# Patient Record
Sex: MALE | Race: WHITE | NOT HISPANIC OR LATINO | Employment: OTHER | URBAN - METROPOLITAN AREA
[De-identification: names, ages, dates, MRNs, and addresses within clinical notes are randomized per-mention and may not be internally consistent; named-entity substitution may affect disease eponyms.]

---

## 2023-01-01 ENCOUNTER — TELEPHONE (OUTPATIENT)
Dept: TRANSPLANT | Facility: HOSPITAL | Age: 58
End: 2023-01-01
Payer: MEDICARE

## 2023-01-01 ENCOUNTER — SOCIAL WORK (OUTPATIENT)
Dept: TRANSPLANT | Facility: HOSPITAL | Age: 58
End: 2023-01-01
Payer: MEDICARE

## 2023-01-01 ENCOUNTER — DOCUMENTATION (OUTPATIENT)
Dept: TRANSPLANT | Facility: HOSPITAL | Age: 58
End: 2023-01-01

## 2023-01-01 ENCOUNTER — OFFICE VISIT (OUTPATIENT)
Dept: TRANSPLANT | Facility: HOSPITAL | Age: 58
End: 2023-01-01
Payer: MEDICARE

## 2023-01-01 ENCOUNTER — EDUCATION (OUTPATIENT)
Dept: TRANSPLANT | Facility: HOSPITAL | Age: 58
End: 2023-01-01
Payer: MEDICARE

## 2023-01-01 ENCOUNTER — DOCUMENTATION (OUTPATIENT)
Dept: TRANSPLANT | Facility: HOSPITAL | Age: 58
End: 2023-01-01
Payer: MEDICARE

## 2023-01-01 ENCOUNTER — LAB REQUISITION (OUTPATIENT)
Dept: LAB | Facility: CLINIC | Age: 58
End: 2023-01-01
Payer: MEDICARE

## 2023-01-01 ENCOUNTER — LAB (OUTPATIENT)
Dept: LAB | Facility: LAB | Age: 58
End: 2023-01-01
Payer: MEDICARE

## 2023-01-01 ENCOUNTER — APPOINTMENT (OUTPATIENT)
Dept: TRANSPLANT | Facility: HOSPITAL | Age: 58
End: 2023-01-01
Payer: MEDICARE

## 2023-01-01 VITALS — HEIGHT: 70 IN | WEIGHT: 162 LBS | BODY MASS INDEX: 23.19 KG/M2

## 2023-01-01 VITALS
HEART RATE: 90 BPM | OXYGEN SATURATION: 95 % | SYSTOLIC BLOOD PRESSURE: 110 MMHG | WEIGHT: 162 LBS | BODY MASS INDEX: 23.99 KG/M2 | DIASTOLIC BLOOD PRESSURE: 62 MMHG | TEMPERATURE: 97 F | HEIGHT: 69 IN

## 2023-01-01 DIAGNOSIS — N18.6 END STAGE RENAL DISEASE (MULTI): ICD-10-CM

## 2023-01-01 DIAGNOSIS — R79.9 ABNORMAL FINDING OF BLOOD CHEMISTRY, UNSPECIFIED: ICD-10-CM

## 2023-01-01 DIAGNOSIS — Z51.81 ENCOUNTER FOR THERAPEUTIC DRUG LEVEL MONITORING: ICD-10-CM

## 2023-01-01 DIAGNOSIS — R94.5 ABNORMAL RESULTS OF LIVER FUNCTION STUDIES: ICD-10-CM

## 2023-01-01 DIAGNOSIS — Z01.818 PRE-TRANSPLANT EVALUATION FOR KIDNEY TRANSPLANT: ICD-10-CM

## 2023-01-01 DIAGNOSIS — Z12.5 ENCOUNTER FOR SCREENING FOR MALIGNANT NEOPLASM OF PROSTATE: ICD-10-CM

## 2023-01-01 DIAGNOSIS — N18.6 ESRD (END STAGE RENAL DISEASE) (MULTI): Primary | ICD-10-CM

## 2023-01-01 DIAGNOSIS — N18.6 ESRD ON DIALYSIS (MULTI): Primary | ICD-10-CM

## 2023-01-01 DIAGNOSIS — Z01.818 PRE-TRANSPLANT EVALUATION FOR KIDNEY TRANSPLANT: Primary | ICD-10-CM

## 2023-01-01 DIAGNOSIS — Z99.2 ESRD ON DIALYSIS (MULTI): Primary | ICD-10-CM

## 2023-01-01 LAB
ABO GROUP (TYPE) IN BLOOD: NORMAL
ALBUMIN SERPL BCP-MCNC: 3.4 G/DL (ref 3.4–5)
ALP SERPL-CCNC: 135 U/L (ref 33–136)
ALT SERPL W P-5'-P-CCNC: 19 U/L (ref 10–52)
AMPHETAMINES SERPL QL SCN: NEGATIVE NG/ML
AMYLASE SERPL-CCNC: 41 U/L (ref 29–103)
ANNOTATION COMMENT IMP: NORMAL
APPEARANCE UR: ABNORMAL
AST SERPL W P-5'-P-CCNC: 16 U/L (ref 9–39)
BACTERIA #/AREA URNS AUTO: ABNORMAL /HPF
BACTERIA UR CULT: ABNORMAL
BARBITURATES SERPL QL SCN: NEGATIVE NG/ML
BENZODIAZ SERPL QL SCN: NEGATIVE NG/ML
BILIRUB DIRECT SERPL-MCNC: 0.3 MG/DL (ref 0–0.3)
BILIRUB SERPL-MCNC: 0.7 MG/DL (ref 0–1.2)
BILIRUB UR STRIP.AUTO-MCNC: NEGATIVE MG/DL
BUN SERPL-MCNC: 85 MG/DL (ref 6–23)
BUPRENORPHINE SERPL-MCNC: NEGATIVE NG/ML
C PEPTIDE SERPL-MCNC: 6.1 NG/ML (ref 0.7–3.9)
CANNABINOIDS SERPL QL SCN: NEGATIVE NG/ML
CMV IGG AVIDITY SERPL IA-RTO: REACTIVE %
COCAINE SERPL QL SCN: NEGATIVE NG/ML
COLOR UR: YELLOW
COTININE SERPL-MCNC: <5 NG/ML
CREAT SERPL-MCNC: 8.4 MG/DL (ref 0.5–1.3)
EBV EA IGG SER QL: NEGATIVE
EBV NA AB SER QL: POSITIVE
EBV VCA IGG SER IA-ACNC: POSITIVE
EBV VCA IGM SER IA-ACNC: ABNORMAL
EBV VCA IGM SER-ACNC: >160 U/ML (ref 0–43.9)
ERYTHROCYTE [DISTWIDTH] IN BLOOD BY AUTOMATED COUNT: 18.6 % (ref 11.5–14.5)
EST. AVERAGE GLUCOSE BLD GHB EST-MCNC: 166 MG/DL
FLOW AUTOCROSSMATCH: NORMAL
FLOW AUTOCROSSMATCH: NORMAL
GFR SERPL CREATININE-BSD FRML MDRD: 7 ML/MIN/1.73M*2
GLUCOSE UR STRIP.AUTO-MCNC: ABNORMAL MG/DL
HBA1C MFR BLD: 7.4 %
HBV CORE AB SER QL: NONREACTIVE
HBV SURFACE AB SER-ACNC: 11.3 MIU/ML
HBV SURFACE AG SERPL QL IA: NONREACTIVE
HCT VFR BLD AUTO: 35.5 % (ref 41–52)
HCV AB SER QL: NONREACTIVE
HGB BLD-MCNC: 11.1 G/DL (ref 13.5–17.5)
HIV 1+2 AB+HIV1 P24 AG SERPL QL IA: NONREACTIVE
HLA CLASS I AB SCREEN,FC: NORMAL
HLA CLASS II AB SCREEN,FC: NORMAL
HLA CLS I TYP PNL BLD/T DONR HIGH RES: NORMAL
HLA CLS I TYP PNL BLD/T DONR HIGH RES: NORMAL
HLA RESULTS: NORMAL
HLA-A+B+C AB NFR SER: NORMAL %
HLA-DP+DQ+DR AB NFR SER: NORMAL %
HLA-DP2 QL: NORMAL
HLA-DP2 QL: NORMAL
HLA-DQB1 HIGH RES: NORMAL
HLA-DQB1 HIGH RES: NORMAL
HLA-DRB1 HIGH RES: NORMAL
HLA-DRB1 HIGH RES: NORMAL
HOLD SPECIMEN: NORMAL
INR PPP: 1.2 (ref 0.9–1.1)
KETONES UR STRIP.AUTO-MCNC: NEGATIVE MG/DL
LEUKOCYTE ESTERASE UR QL STRIP.AUTO: ABNORMAL
MCH RBC QN AUTO: 28.2 PG (ref 26–34)
MCHC RBC AUTO-ENTMCNC: 31.3 G/DL (ref 32–36)
MCV RBC AUTO: 90 FL (ref 80–100)
METHADONE SERPL QL SCN: NEGATIVE NG/ML
METHAMPHET SERPL QL: NEGATIVE NG/ML
NICOTINE SERPL-MCNC: <5 NG/ML
NIL(NEG) CONTROL SPOT COUNT: NORMAL
NITRITE UR QL STRIP.AUTO: NEGATIVE
NRBC BLD-RTO: 0 /100 WBCS (ref 0–0)
OPIATES SERPL QL SCN: NEGATIVE NG/ML
OXYCODONE SERPL QL: NEGATIVE NG/ML
PANEL A SPOT COUNT: 0
PANEL B SPOT COUNT: 0
PCP SERPL QL SCN: NEGATIVE NG/ML
PH UR STRIP.AUTO: 6 [PH]
PHOSPHATE SERPL-MCNC: 4.3 MG/DL (ref 2.5–4.9)
PLATELET # BLD AUTO: 139 X10*3/UL (ref 150–450)
POS CONTROL SPOT COUNT: NORMAL
PROT SERPL-MCNC: 6.9 G/DL (ref 6.4–8.2)
PROT UR STRIP.AUTO-MCNC: ABNORMAL MG/DL
PROTHROMBIN TIME: 13.8 SECONDS (ref 9.8–12.8)
PSA FREE MFR SERPL: 22 %
PSA FREE SERPL-MCNC: 1.3 NG/ML
PSA SERPL IA-MCNC: 5.8 NG/ML (ref 0–4)
RBC # BLD AUTO: 3.94 X10*6/UL (ref 4.5–5.9)
RBC # UR STRIP.AUTO: ABNORMAL /UL
RBC #/AREA URNS AUTO: >20 /HPF
RH FACTOR (ANTIGEN D): NORMAL
SP GR UR STRIP.AUTO: 1.01
T PALLIDUM AB SER QL: NONREACTIVE
T-SPOT. TB INTERPRETATION: NEGATIVE
UROBILINOGEN UR STRIP.AUTO-MCNC: <2 MG/DL
VZV QPCR,QUANT,PLASMA, VIRC: NOT DETECTED COPIES/ML
WBC # BLD AUTO: 8.6 X10*3/UL (ref 4.4–11.3)
WBC #/AREA URNS AUTO: >50 /HPF

## 2023-01-01 PROCEDURE — 86481 TB AG RESPONSE T-CELL SUSP: CPT

## 2023-01-01 PROCEDURE — 85027 COMPLETE CBC AUTOMATED: CPT

## 2023-01-01 PROCEDURE — 84100 ASSAY OF PHOSPHORUS: CPT

## 2023-01-01 PROCEDURE — 86901 BLOOD TYPING SEROLOGIC RH(D): CPT

## 2023-01-01 PROCEDURE — 82565 ASSAY OF CREATININE: CPT

## 2023-01-01 PROCEDURE — 80076 HEPATIC FUNCTION PANEL: CPT

## 2023-01-01 PROCEDURE — 87186 SC STD MICRODIL/AGAR DIL: CPT

## 2023-01-01 PROCEDURE — 99215 OFFICE O/P EST HI 40 MIN: CPT | Performed by: STUDENT IN AN ORGANIZED HEALTH CARE EDUCATION/TRAINING PROGRAM

## 2023-01-01 PROCEDURE — 86665 EPSTEIN-BARR CAPSID VCA: CPT

## 2023-01-01 PROCEDURE — 84154 ASSAY OF PSA FREE: CPT

## 2023-01-01 PROCEDURE — 85610 PROTHROMBIN TIME: CPT

## 2023-01-01 PROCEDURE — 87389 HIV-1 AG W/HIV-1&-2 AB AG IA: CPT

## 2023-01-01 PROCEDURE — 87799 DETECT AGENT NOS DNA QUANT: CPT

## 2023-01-01 PROCEDURE — 87340 HEPATITIS B SURFACE AG IA: CPT

## 2023-01-01 PROCEDURE — 80307 DRUG TEST PRSMV CHEM ANLYZR: CPT

## 2023-01-01 PROCEDURE — 86663 EPSTEIN-BARR ANTIBODY: CPT

## 2023-01-01 PROCEDURE — 86825 HLA X-MATH NON-CYTOTOXIC: CPT | Mod: OUT | Performed by: SURGERY

## 2023-01-01 PROCEDURE — 3008F BODY MASS INDEX DOCD: CPT | Performed by: HOSPITALIST

## 2023-01-01 PROCEDURE — 99205 OFFICE O/P NEW HI 60 MIN: CPT

## 2023-01-01 PROCEDURE — 99215 OFFICE O/P EST HI 40 MIN: CPT

## 2023-01-01 PROCEDURE — 82150 ASSAY OF AMYLASE: CPT

## 2023-01-01 PROCEDURE — 87086 URINE CULTURE/COLONY COUNT: CPT

## 2023-01-01 PROCEDURE — 86706 HEP B SURFACE ANTIBODY: CPT

## 2023-01-01 PROCEDURE — 80323 ALKALOIDS NOS: CPT

## 2023-01-01 PROCEDURE — 86900 BLOOD TYPING SEROLOGIC ABO: CPT

## 2023-01-01 PROCEDURE — 3008F BODY MASS INDEX DOCD: CPT | Performed by: STUDENT IN AN ORGANIZED HEALTH CARE EDUCATION/TRAINING PROGRAM

## 2023-01-01 PROCEDURE — 84520 ASSAY OF UREA NITROGEN: CPT

## 2023-01-01 PROCEDURE — 99417 PROLNG OP E/M EACH 15 MIN: CPT | Performed by: STUDENT IN AN ORGANIZED HEALTH CARE EDUCATION/TRAINING PROGRAM

## 2023-01-01 PROCEDURE — G2212 PROLONG OUTPT/OFFICE VIS: HCPCS | Performed by: STUDENT IN AN ORGANIZED HEALTH CARE EDUCATION/TRAINING PROGRAM

## 2023-01-01 PROCEDURE — 86664 EPSTEIN-BARR NUCLEAR ANTIGEN: CPT

## 2023-01-01 PROCEDURE — 81001 URINALYSIS AUTO W/SCOPE: CPT

## 2023-01-01 PROCEDURE — 86803 HEPATITIS C AB TEST: CPT

## 2023-01-01 PROCEDURE — 86780 TREPONEMA PALLIDUM: CPT

## 2023-01-01 PROCEDURE — G0103 PSA SCREENING: HCPCS

## 2023-01-01 PROCEDURE — 86644 CMV ANTIBODY: CPT

## 2023-01-01 PROCEDURE — 36415 COLL VENOUS BLD VENIPUNCTURE: CPT

## 2023-01-01 PROCEDURE — 81382 HLA II TYPING 1 LOC HR: CPT | Mod: 59,OUT | Performed by: SURGERY

## 2023-01-01 PROCEDURE — 86704 HEP B CORE ANTIBODY TOTAL: CPT

## 2023-01-01 PROCEDURE — 84681 ASSAY OF C-PEPTIDE: CPT

## 2023-01-01 PROCEDURE — 86832 HLA CLASS I HIGH DEFIN QUAL: CPT | Mod: OUT | Performed by: SURGERY

## 2023-01-01 PROCEDURE — 83036 HEMOGLOBIN GLYCOSYLATED A1C: CPT

## 2023-01-01 RX ORDER — SEVELAMER CARBONATE 800 MG/1
800 TABLET, FILM COATED ORAL
COMMUNITY
Start: 2023-01-01

## 2023-01-01 RX ORDER — INSULIN ASPART 100 [IU]/ML
INJECTION, SOLUTION INTRAVENOUS; SUBCUTANEOUS
COMMUNITY
Start: 2017-06-02

## 2023-01-01 RX ORDER — OMEPRAZOLE 40 MG/1
40 CAPSULE, DELAYED RELEASE ORAL
COMMUNITY
Start: 2023-01-01

## 2023-01-01 RX ORDER — INSULIN DETEMIR 100 [IU]/ML
20 INJECTION, SOLUTION SUBCUTANEOUS NIGHTLY
COMMUNITY
Start: 2018-07-12

## 2023-01-01 RX ORDER — ASPIRIN 81 MG/1
81 TABLET ORAL DAILY
COMMUNITY
Start: 2017-09-12

## 2023-01-01 RX ORDER — PREDNISONE 5 MG/1
5 TABLET ORAL DAILY
COMMUNITY
Start: 2023-01-01

## 2023-01-01 RX ORDER — FERROUS SULFATE 325(65) MG
325 TABLET ORAL
COMMUNITY

## 2023-01-01 RX ORDER — TACROLIMUS 1 MG/1
1 CAPSULE ORAL 2 TIMES DAILY
COMMUNITY
Start: 2021-10-30

## 2023-01-01 RX ORDER — AMIODARONE HYDROCHLORIDE 200 MG/1
200 TABLET ORAL DAILY
COMMUNITY
Start: 2023-01-01 | End: 2024-07-26

## 2023-01-01 RX ORDER — ACETAMINOPHEN 500 MG
2000 TABLET ORAL DAILY
COMMUNITY

## 2023-01-01 RX ORDER — BRIMONIDINE TARTRATE 1 MG/ML
1 SOLUTION/ DROPS OPHTHALMIC 2 TIMES DAILY
COMMUNITY

## 2023-01-01 RX ORDER — ATORVASTATIN CALCIUM 40 MG/1
40 TABLET, FILM COATED ORAL DAILY
COMMUNITY

## 2023-01-01 RX ORDER — MULTIVITAMIN
1 TABLET ORAL DAILY
COMMUNITY

## 2023-01-01 ASSESSMENT — ANXIETY QUESTIONNAIRES
7. FEELING AFRAID AS IF SOMETHING AWFUL MIGHT HAPPEN: NOT AT ALL
GAD7 TOTAL SCORE: 0
IF YOU CHECKED OFF ANY PROBLEMS ON THIS QUESTIONNAIRE, HOW DIFFICULT HAVE THESE PROBLEMS MADE IT FOR YOU TO DO YOUR WORK, TAKE CARE OF THINGS AT HOME, OR GET ALONG WITH OTHER PEOPLE: NOT DIFFICULT AT ALL
1. FEELING NERVOUS, ANXIOUS, OR ON EDGE: NOT AT ALL
5. BEING SO RESTLESS THAT IT IS HARD TO SIT STILL: NOT AT ALL
3. WORRYING TOO MUCH ABOUT DIFFERENT THINGS: NOT AT ALL
4. TROUBLE RELAXING: NOT AT ALL
6. BECOMING EASILY ANNOYED OR IRRITABLE: NOT AT ALL
2. NOT BEING ABLE TO STOP OR CONTROL WORRYING: NOT AT ALL

## 2023-01-01 ASSESSMENT — PATIENT HEALTH QUESTIONNAIRE - PHQ9
1. LITTLE INTEREST OR PLEASURE IN DOING THINGS: NOT AT ALL
10. IF YOU CHECKED OFF ANY PROBLEMS, HOW DIFFICULT HAVE THESE PROBLEMS MADE IT FOR YOU TO DO YOUR WORK, TAKE CARE OF THINGS AT HOME, OR GET ALONG WITH OTHER PEOPLE: NOT DIFFICULT AT ALL
2. FEELING DOWN, DEPRESSED OR HOPELESS: NOT AT ALL
6. FEELING BAD ABOUT YOURSELF - OR THAT YOU ARE A FAILURE OR HAVE LET YOURSELF OR YOUR FAMILY DOWN: NOT AT ALL
SUM OF ALL RESPONSES TO PHQ QUESTIONS 1-9: 1
SUM OF ALL RESPONSES TO PHQ9 QUESTIONS 1 & 2: 0
3. TROUBLE FALLING OR STAYING ASLEEP OR SLEEPING TOO MUCH: NOT AT ALL
8. MOVING OR SPEAKING SO SLOWLY THAT OTHER PEOPLE COULD HAVE NOTICED. OR THE OPPOSITE, BEING SO FIGETY OR RESTLESS THAT YOU HAVE BEEN MOVING AROUND A LOT MORE THAN USUAL: NOT AT ALL
5. POOR APPETITE OR OVEREATING: NOT AT ALL
7. TROUBLE CONCENTRATING ON THINGS, SUCH AS READING THE NEWSPAPER OR WATCHING TELEVISION: NOT AT ALL
9. THOUGHTS THAT YOU WOULD BE BETTER OFF DEAD, OR OF HURTING YOURSELF: NOT AT ALL
4. FEELING TIRED OR HAVING LITTLE ENERGY: SEVERAL DAYS

## 2023-01-01 ASSESSMENT — PAIN SCALES - GENERAL: PAINLEVEL: 0-NO PAIN

## 2023-12-05 NOTE — PROGRESS NOTES
"ENCOUNTER    Visit Type Initial Visit   Location: [place] [address]     Barriers to Communication / Understanding:   [] Language [] Vision [] Hearing [] Other      []  Present     Accompanied By: Dr. Katharine Youngblood, Pt reported she is family and their relationship is \"adoption\"      Organ For Transplant:  Kidney    Ethnicity:  White    ADLs Fully Independent      Instrumental ADLs Fully Independent      Level of Activity Active      DME     Knowledge of Health Good  Pt reported he was previously diagnosed with pulmonary hypertension then that was found untrue.     Why Do You Have End Stage Organ Disease   Pt reported undiagnosed asymptomatic type 2 diabetes. Pt reported he inherited this from his maternal grandfather.   Knowledge of Transplant / VAD:  Yes Patient Is Able To Make An Informed Decision    Patient Understands the Risks of Transplant / VAD  Yes Rejection  Yes Infection Yes Complications  Yes Death      Patient Understands Recovery and Follow-Up from Transplant / VAD  Yes Length Of State Yes Appointments  Yes Labs  Yes Rehabilitation      Patient Has Identified Goals of Transplant / VAD Yes  Pt reported \"to thrive again.\"    Any Potential Donors?     Overall Compliance  good     Pt shared he is taking 8 medications daily   Compliance With Medications good    Managed By Patient Pill box     Understanding Of Medication  good  Compliance With Appointments good    How Does Patient Handle Health Problems      Organ  Kidney    IOP:     Fluid Restriction:    [] Compliant     Medical And Clinic Appointment Compliant     Dialysis:  [x] What Dialysis Center [facility]  [x] Began in May 15th of 2023      [] In Center [] Home Hemo [] Peritoneal       Attendance:  Treatment Attendance Good Treatment Time: M,W,F and runs for 3 hours     [] Shortened Treatments [] Rescheduled Treatments [] Missed Treatments       Fluids:     Does Patient Still Urinate  [] Fluid Restriction [] IDWG [] EDW  " Achieved Dry Weight        Diet:   Patient is compliant with renal restrictions     Patient is compliant with low sodium diet      Labs:    [] Patient Reported [] Collateral       []  Potassium [] Albumin [] Phosphorus      # of Binders:  [x] # of Binders per meal 2  [x] Meals per day 2      [x]  # of Binders per Snack 1 [x] Snacks per day 1 [] Phosphorus     Pancreas:  [] Checks blood sugar      times/day [] A1c   Hypoglycemic Episodes  Unawareness  Outside Interventions    Liver:  Is Lactulose prescribed  Dose:   Timesper day:  Is patient compliant       SOCIAL HISTORY  No   ?  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 Pt reported he does not get paid for this. Pt reported he began receiving disability in 2013 and he receives $2,700/monthly.   Patient's Current Employment    [] Full-Time [] Part-Time [] Unemployed [] Retired     []  None [] Not working by choice [x] Not working disabled     [] Short Term Leave   [] Other   Employment History     Will patient have paid status from employment during recovery       Spouse / SO Current Employment     Will spouse / SO have paid status from employment during recovery       Other Sources of Income Total Household Income $2,700      Does patient have financial concerns No     Is patient able to meet current monthly expenses Yes    Resources:      Patient was provided information on transplant fundraising       Insurance:  Primary Insurance Medicare [employer] [facility] [place]     Secondary Insurance Medicaid    Prescription Coverage Copay cost per month $0     Medicare coverage due to     Medicare Part A  Effective date    Medicare Part B  Effective date    Medicare Part C  Deductable   "Out of Pocket Max    Medicare Part D  Extra Help?    Medicaid  Waiver  Redetermination Date    HMO       FAMILY SYSTEM    Single     How long   Describe Relationship    How long   Describe Relationship   Yes, Ivri  When    When  In a Relationship   How Long  Describe Relationship    Spouse / SO Name    Age   Health   Other Caregiver Responsibilities  Spouse / Significant others reaction to donation    Children:  No # Biological   # Adopted    # Step Children        Child #1 Name  Age   Health    Lives   How Much Contact       Child #2 Name  Age   Health    Lives   How Much Contact     Child #3 Name Age  Health  Lives   How Much Contact     Parents:  Raised By One Biological Parent 1 parent; his biological mom     Did the patient have contact with the other parent No    Mother  Yes Age 73  Cause of Death Extenuating health problems: smoked, bypass surgery, etc.   Father   Age   Cause of Death    Living Parent #1  Living Parent #2    Additional Information    Siblings:  [] # Biological  [x] # Half Siblings (1 brother, 1 sister)   [] # Step Siblings   Pt reported he is not close with them   Sibling #1  Sibling #2    Support & Recovery Plan:  Yes Adequate    Primary Support:  Name Dr. Katharine Youngblood  Phone [phone]  Age 76    Relationship to Patient \"He is like a child to me.\"   If employed, can they take time off work Not working   If so, is it paid time off    If not, will this impact your finances No   Did they attend education classes Yes   Do they have other caregiver responsibilities (child or eldercare) No   Do they have their own conditions which may prevent them from providing care for you No  (Medical, psychological, physical limitations)    Are they available on short notice Yes   Are they reliable Yes   Are they responsible Yes   Are they able to understand and process new information Yes   Do they have reliable transportation or will you allow them to " "use your vehicle Yes   Are they currently involved in your care Yes   Comments    Secondary Support:  Name Omaira Hernadez Phone [phone] Age 59  Relationship to Patient Friend of over 40 years   If employed, can they take time off work Yes \"I guess so\"  If so, is it paid time off    If not, will this impact your finances No   Did they attend education classes No   Do they have other caregiver responsibilities (child or eldercare) No   Do they have their own conditions which may prevent them from providing care for you No  (Medical, psychological, physical limitations)    Are they available on short notice  Yes  Are they reliable Yes   Are they responsible Yes   Are they able to understand and process new information Yes   Do they have reliable transportation or will you allow them to use your vehicle Yes   Are they currently involved in your care Yes   Comments    Alternate Support  Alternate Support    Housing:  Yes Adequate Rents home  Type of Home Apartment  [facility]  Pets 1 cat   Does Patient Feel Safe in Home Yes      Transportation:  Yes Adequate  # Licensed Drivers in the Home 1  Does Patient Drive Yes If not, why  # Reliable Vehicles 1  Does Patient use Public Transportation No  Does Patient use Medical Transportation No  Comments    MENTAL HEALTH    Cognition:  No impairment observed / reported    The patient reports their mood as \"I'm pretty optimistic overall.\"       Reported Mental Health Diagnosis Denied   Family History of Mental Health Concerns Mom- diagnosed with a personality disorder, bipolar disorder, anxiety, and depression.   What are patient psychosocial stressors        Current Medications:  Yes  Mental Health Meds  Denied  Rx'd by   Sleep Meds Denied  Rx'd by   Pain Meds Denied  Rx'd by     OTC Meds Baby aspirin, fish oil, tylenol when needed, iron tab, multivitamin, and vitamin D  Past Medications Denied    Counseling Previously  Pt reported he went to individual therapy " when he was in his 20's after he broke up with his first love. Pt reported he found this helpful and he learned how to flirt in his therapist's office. Pt shared he began dating his therapist. Pt reported he quit after that and reported that he didn't really need it. Pt reported he went for a time span of two months. Pt reported he saw another therapist in 2012 after his mom convinced him he had issues and not her. Pt shared he did one appointment and felt better. Pt denied any desire for counseling at this time.     Has patient ever been hospitalized for mental health reasons No   Was the hospitalization voluntary  Duration   Where    When  Describe situation    Discharge Plan for Follow Up  Was Discharge Plan Completed   Referral to Transplant Psych No   Mental Health Follow Up Required      Suicide Assessment:  History of Suicide Ideation No  [] Timeframe [] Frequency   Frequency   Plan Created  Intent to Follow Through  Outcome      History of Suicide Attempt No     History of Suicidal Ideation in the past 3 months No Intensity   Duration     Description of Plan      Plans thought of  Intent to Follow Through  Highest Level of Intent to Follow Through    Current Plan for Safety    Plan for Follow-Up    Patient's Reported Trauma History    What are patient's coping behaviors Pt reported he used to go to the gym as a social opportunity. 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     Buddhist / Spirituality: Spiritual     Attitude toward interviewer Cooperative and Appropriate    Eye Contact Patient maintained good eye contact throughout appointment    Appearance The patient was neatly groomed, appropriately dressed and adequately nourished    Affect Appropriate    Thought Process Appropriate    Substance Use /Abuse History:    Current Tobacco User No  Patient uses   Tobacco Frequency   For How Long  Is Patient Required to Quit     Former Tobacco User  Yes  Describe past tobacco use and date quit  Pt reported when he was 14 years old he tried no more than 2 cigarettes and never smoked again.     Current Alcohol User No  Type of Alcohol Used   Amount  Frequency   Pattern of Alcohol Use    Is Patient Required to Quit   Continued to use the substance despite being told the substance is affecting their health    History of problems at work, school or home due to substance use      Former Alcohol User No  Describe past alcohol use and date quit      Has patient ever gone to CD treatment   If yes, When, Where and What type of Program  Attends AA meetings    Sponsor  Do support people drink alcohol   If yes, describe support people's use  Is alcohol kept in the home   Does Patient need to sign a CD contract     Current Illegal / Unprescribed Drug User No  Type of Illegal Drug Used   Frequency  Pattern of Drug Use    Is Patient Required to Quit     Illegal / Unprescribed Drug #2  Type of Illegal Drug Used   Frequency  Pattern of Drug Use      Continue to use the substance despite being told the substance is affecting their health    History of problems at work, school or home due to substance use      Former Illegal / Unprescribed Drug User No  Describe past illegal drug use and date quit    Has patient ever gone to CD treatment   If yes, When, Where and What type of Program   Attends AA/NA  meetings    Is patient on a Methadone / Suboxone regiment   Do support people use illegal drugs   If yes, describe support people's use  Are illegal drugs kept in the home   Does Patient need to sign a CD contract     Illegal / Unprescribed Drug #2  Type of Illegal Drug Used   Frequency  Pattern of Drug Use    Prescription Drug Abuse:  No Has patient experienced feelings of addiction  No Has patient experienced symptoms of withdrawal  No Has patient experienced any side effects? e.g.  hallucinations or delusions    Does Patient Meet the Criteria for Alcohol Use Disorder  "No Diagnosis  Does Patient Meet [facility] guidelines Yes  Does Patient Meet the Criteria for Illegal Drug Use Disorder No Diagnosis  Does Patient Meet [facility] guidelines Yes    [facility] Substance Relapse Risk Factors   DSM-5 Severity Factors:     IOP     LEGAL ISSUES  No Arrests  Currently probation or parole    senior living   When   How long   Where       Citizenship:  Yes [place] Citizen   Green Card  Visa    Advance Directives: Yes Pt reported he has Omaira listed. Pt reported it's old.   [facility]     ALMITA provided documents.   Guardian:    MESSI RECIO met with Pt and Pt's family member, Dr. Katharine Youngblood for initial psychosocial assessment. Pt was pleasant and engaged. Pt reported that he has Medicare and Medicaid, [facility]. Pt reported a good understanding of education, as he had a kidney transplant in 2019. Pt denied any current financial concerns. Pt reported the cause of his kidney disease is undiagnosed asymptomatic type II diabetes. Pt reported a goal of transplant for himself is to thrive. Pt reported good compliance with dialysis, medications, and medical appointments. Pt listed his family member, Dr. Katharine Youngblood as primary support and his friend, Omaira Hernadez as secondary support. Pt reported his mood as \"I'm pretty optimistic overall.\" Pt reported that he has gone to therapy twice in his life and denies any MH diagnosis. Pt denied any MH hospitalizations. Pt scored a 1 on the PHQ-9, indicating minimal clinical depression. Pt scored a 0 on the JULIA-7, indicating no clinical anxiety. Pt denied any current tobacco use. Pt reported he has tried no more than 2 cigarettes in his life and this was when he was 14 years old. Pt denied any current/past alcohol or illicit drug use. SW administered documents. Pt scored a 4 on the SIPAT, indicating Pt is an excellent candidate for transplant. SW would recommend to list Pt for transplant once secondary support is confirmed.     PLAN  SW to call and confirm " secondary support. SW to see Pt annually.

## 2023-12-05 NOTE — PROGRESS NOTES
Kidney Transplant Evaluation Office Visit    Chief Complaint: Patient presents for kidney transplant evaluation; previous DDKT in 2019 at     History of Present Illness:  Ramy Smalls  is a 58 y.o. male presents with ESRD from Type 2 DM. He underwent a DDKT in 2019 with us, that failed recently and he returned to HD in 5/2023.    He self reports good compliance with immunosuppression but notably, had transferred his post-transplant care to the [facility]. After his initial DDKT, he developed a lymphocele, wound infection and required wound Vac, and underwent laparoscopic drainage of lymphocele in 2020.    He has significant cardiac history which includes HF due to restrictive cardiomyopathy leading to recurrent ascites (required weekly paracentesis for past 2 years until returning to HD in 5/2023). He was seen by Dr Uriarte in Hepatology and underwent work-up for recurrent ascites including a trans-jugular liver biopsy in 12/2022 that showed patchy cetrizonal sinusoidal dilation and congestion with perivenular hepatocyte dropout suggestive of Vascular outflow compromise. Venous gradient was 3 mm Hg, and portal pressure was 23. This thought to be likely secondary to HF or Pulmonary hypertension.    He underwent a biopsy of the renal allograft in 5/2023 that revealed extensive cortical necrosis and returned to HD in 5/2023.    He is currently on Tacrolimus and Prednisone    Has extensive cardiac history and includes A Fib w RVR, CHF with EF 48%, CABG x2. RVSP on ECHO in 2/2023 was 81, repeat RVSP on ECHO in 10/2023 was 60 and EF was >65% (in care everywhere). Cardiac MRI was performed in 5/2023 and amyloidosis was ruled out.    Hemodialysis: Monday, Wednesday, Friday since 5/2023 (6.5 yrs of HD prior to DDKT in 2019)  Dialysis Access: LUE AVF  Urine Status: oliguric - <1 cup a day   Disease Etiology: Diabetic nephropathy and hypertensive nephropathy.   Disease Complications: Congestive  heart failure, dyslipidemia and hypertension.   PVD: YES - on outside CT from 10/2023 - obtain images  Prior Abdominal Surgery: YES - prior RIF kidney transplant   Prior Malignancy: NO   BMI: Body mass index is 23.92 kg/m².    Review of Systems:  Cardiac: Denies chest pain, palpitations  : Normal urine output. Denies history of gross hematuria, nephrolithiasis, urinary retention, or recurrent UTIs.  Vascular: Denies personal or familial history of DVT/PE. No active claudication or non-healing LE wounds.  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 Prior Right lower quadrant kidney transplant scar noted. Pannus +.  Ext: No LE edema  Vascular: 2+ palpable femoral pulses  Psychiatric: Normal mood, affect    Assessment/Plan:  - The patient is a marginal candidate for kidney transplantation pending psychosocial and cardiac evaluation  - Routine age/gender based screening  - Cardiac testing per protocol: ECHO/stress test  - Non-contrast CT scan abdomen/pelvis  - Good functional status    Most of this work-up has recently been completed at [facility] - we will attempt to obtain imaging and remaining work-up and try and avoid repeating it if at all possible.    Surgical Considerations:  Re-transplant  Compliance  Cardiac history    Transplant Education:    I  had a discussion with this patient regarding 1 year graft and patient survival statistics following renal transplantation for both living and  donor allograft recipients. This data included [facility] data compared to National data readily available for review on https://www.SRTR.org. The patient also had attended the kidney transplant education class provided by the transplant institute.     The difference between allograft function was discussed comparing living donor, KDPI 0-85%, and >85% kidneys.     Further discussion included:  -The transplant selection committee process.  -The need for lifelong immunosuppressive therapy, and the side effects of these medications including the risk of infections, cancer, and lymphoma.  -The wait list time approximately is 5 years or more for  donor transplants and the statistical superiority of a living donor.     -Using identified donors with risk criteria for transmission of infection  -The possibility of utilizing  donors with known HCV antibody and/or DARREN positivity and post-transplant treatment/surveillance protocol  -Potential transmission of infectious disease from any  donors, as well as living donors.   -The possibility of transmission of tumors and infections via the transplanted organ.  -The inability to completely test for all potential harmful tumors or infectious agents.  -The possibility of listing at multiple locations.     Surgical complications including need for reoperation(s) including but not limited to:  -Bleeding.  -Repair of leaks.  -Control of infection.  -Blood clots in the transplant vessels.  -Possible kidney transplant removal.     The medical complications including but not limited to:  -Death.  -Cardiac.  -Pulmonary.  -Infectious.  -Neurologic.  -Other Complications.     We also discussed how the kidney transplant could function:  -Non-function and possible kidney transplant removal within the first 3 to  6 months.  -Delayed graft function (dialysis needed after transplant).  -The potential of recurrence of kidney disease leading to kidney transplant graft loss.    Time Attestation:  I spent 60 minutes with the patient, over 50 minutes in counseling and education as outlined above.    Reshma Sheikh MD, [facility]  Transplant & Hepatobiliary Surgery

## 2023-12-05 NOTE — PROGRESS NOTES
Transplant Nephrology Evaluation :    Ramy Smalls is a 58 y.o.  with history of ESRD secondary to type 2 diabetes was transplanted prior in 2019 which failed in May 2023 and currently on dialysis Monday Wednesday Friday through fistula.  He gets dialyzed at [facility] and his primary nephrologist is Dr. Tran .    History in detail : Mr. Ramy Smalls is a 58-year-old male with a history of ESRD secondary to type 2 diabetes status post prior desisted kidney transplant in 2019 at  which failed in May 2023.  Patient actually got transplanted here at  because of some issues posttransplant he transferred himself care to [facility] eventually transferred to Dr. Sun a primary nephrologist and was evaluated for retransplantation at [facility] but patient does not like the evaluation process and came here for transplant evaluation.  -Prior transplantation done here at  on 9/26/2019 at the posttransplant complication of the retroperitoneal seroma and abscess and wound VAC management patient also underwent laparoscopic drainage of the lymphocele done in August 2020.  -He presented with significant acute kidney injury with uremia and was started on dialysis.  He underwent biopsy which showed a significant extensive cortical necrosis.  He has been on dialysis since then.  -In the recent admission in June in the setting of heart failure secondary to restrictive cardiomyopathy associated with ascites and recurrent paracentesis.  Cardiac MRI was done to rule out cardiac amyloidosis and patient also had A-fib with RVR during that admission that required anticoagulation but because of the bleeding is stopped anticoagulation.  -He went AGAINST MEDICAL ADVICE during that admission.  -Dialysis history: Currently getting dialyzed through fistula working well he still urinates 1 and half cups a day his dry weight is 72 kg.  -Was evaluated by our hepatologist Dr. Uriarte in January 2023 for recurrent ascites underwent a liver  biopsy which showed gradient of 3 mm, portal pressure 23 with feel free pressure of 20 suggest to passive hepatic congestion.  Liver biopsy also confirmed passive hepatic congestion likely because of the ascites.  It was mentioned likely due to heart or pulmonary hypertension.  -Denied any history of stones or prior transfusions but it was mentioned that he was off for transfusion during last admission which she denied .  -He does have history of CABGx2 in 2014: He mentions that he had a mild blockage and cardiologist recommended CABG.    Past Medical History : As above.    Surgical History: CABG in 2014, cataracts, tonsillectomy, fistula placement.    Family HX: Nothing significant and no history of cancers in the family    Social Connections: Not on file      Denied smoking drug use.  He used to be a entertainment industry executive and lives with his adopted mother.  He still takes tacrolimus and prednisone for his prior kidney transplant.      PROBLEM LIST:  Active Problems:  There are no active Hospital Problems.         ALLERGIES:  Not on File         CURRENT MEDICATIONS:  Scheduled medications    Continuous medications    PRN medications         OBJECTIVE:    VITALS: There were no vitals taken for this visit.     General: No distress   Mucosa moist   AI, AC, AF     HEENT: PEERLA  CVS: S1 S2 no murmurs  RESP:  Lungs clear to auscultation   ABDO: Soft, non-tender   Neuro: A + O x 3  Skin: No rash   Extremities: No edema       LABS:  Results from last 72 hours   Lab Units 12/05/23  1039   WBC AUTO x10*3/uL 8.6   HEMOGLOBIN g/dL 11.1*   MCV fL 90   PLATELETS AUTO x10*3/uL 139*   BUN mg/dL 85*   CREATININE mg/dL 8.40*          [unfilled]       ASSESSMENT AND PLAN:    Ramy Smalls is a 58 y.o.  with history of ESRD secondary to type 2 diabetes was transplanted prior in 2019 which failed in May 2023 and currently on dialysis Monday Wednesday Friday through fistula.  He gets dialyzed at [facility]  and his primary nephrologist is Dr. Tran .    Seems to be a marginal candidate for kidney transplantation :  -Karnofsky score is a greater than 90.  -Last cardiac cath done as of 2023 showing restrictive cardiomyopathy physiology and with elevated right and left filling pressures with preserved cardiac index and cardiac output.  PA pressures are around 75/30 mmHg.  -Echocardiogram showing EF 60 to 65%, right ventricular pressure overload with paradoxical septal wall motion.  Elevated left atrial pressure with reduced right 20, systolic function.  Moderate tricuspid regurgitation severely elevated right ventricle systolic pressures RVSP has increased from being moderately elevated at 56 to severely elevated 81 mmHg.  -Considering her significant elevation in the RVSP and overall cardiac history seems to be a poor candidate for transplantation.  -In case if you proceed further we need GI clearance/hepatology clearance regarding his recurrent ascites which is not clear likely due to cardiac etiology  -Also needs psychosocial clearance to have a clear idea who is the primary caregiver.  -Otherwise he signed up for high KDPI, DCD, hepatitis B and hepatitis C kidneys.    Additional Information:  Risks and benefits of transplantation including overall average increase in life expectancy and higher quality of life with transplantation for most recipients;   Types of donor organs discussed with patient, including Living donor (if appropriate), , expanded criteria, donor cardiac death (DCD) and CDC-High risk donor types, with relative benefit of living donation over  donor transplantation explained. Exploration of potential donors done with patient;   Lengthy discussion regarding Immunosuppression following transplantation. I explained our usual protocols, drugs involved, administration and monitoring. This included the risks of infection, possibly life threatening, and malignancy;   -The patient was  re-educated regarding the responsibilities of being listed on the transplant waitlist.  - Patient encouraged to avoid blood transfusion unless it's deemed a medically necessary.  -Educated patient on the current allocation policy per [facility] regarding kidney and/or kidney-pancreas/pancreas transplant.  - Education covered the need for monthly serum samples to be drawn and sent to the [facility] while actively listed.  - The patient was told to inform the Pre Transplant office if there are any changes in their medical condition, demographics, insurance coverage ( including medication coverage) and or dialysis units.  - The patient was reminded to fax test results of studies that were obtained outside  facility.  -Explained lifetime immunosuppression therapy and frequency of blood draws/labs post-op and post-op course of treatment.              Thank you for consulting :  Carmen Rausch MD

## 2023-12-05 NOTE — PROGRESS NOTES
Patient attended in-person consent signing on 12/05/2023.  Virtual education was completed prior to in-person consent signing.  Accompanied to class with female support.  Patient remained attentive throughout the review session, asking appropriate questions.  Evaluation consents were signed.     PRE-TRANSPLANT EDUCATION  Patient received education regarding the following topics as part of their pre-transplant evaluation:  The evaluation process, including:   Transplant team members and roles    Required consultations and testing   Selection criteria and suitability for transplant   Listing process and receiving an organ offer   Psychosocial and financial considerations for a successful transplant   Patient responsibilities, including the necessity of adhering to a strict medical regimen  An overview of the surgical procedure   Potential medical, surgical, and psychosocial risks to transplantation, including:   Wound infection   Pneumonia   Blood clot formation   Organ rejection, failure, and possibility of re-transplantation   Lifetime immunosuppression therapy and associated risks   Arrhythmias and cardiovascular collapse   Multi-organ system failure   Death   Depression   Post-Traumatic Stress Disorder   Generalized anxiety, issues of dependence, and feelings of guilt  Available alternatives to transplantation  Donor risk factors that could affect the success of the transplant and the health of the patient, including:   Donor age   Donor medical and social history   Condition of the organ   Risk of jessie cancer, HIV, Hepatitis B, Hepatitis C, or malaria if the infection is not detectable at the time of donation  Patient?s right to withdraw consent for transplantation at any time during the process  Transplants not performed in a Medicare-approved transplant center could affect the patient?s ability to have immunosuppression medication paid for under Medicare part B.   Multiple listing options.    Patient was  given the opportunity to have questions answered. Patient was provided a copy of the informed consent for transplant evaluation.    Signed evaluation informed consent received? 12/05/2023

## 2023-12-08 NOTE — PROGRESS NOTES
Eval Clinic Note:  Patient attended evaluation appts on 12/5/2023 with Dr. Sheikh  and Dr. Morales.  Medications and allergies reviewed at that time with patient.  Patient ambulated to [facility].  Presented to appt with no one.  History:  Patient has a history of prior transplant, a. Fib, cabg x 2,   Testing completed recently: chest xray, echo, stress test, ct abd/pelvis, colonoscopy.  Testing needed for evaluation: cardiac clearance  Listing Consent completed at app; patient consented to hear offers from dcd, high kdpi > 85 %, hep b, and hep c.  Comments: Provided patient with my contact info for questions and concerns.  Patient signed 30/60 Letter and copy uploaded in OTTR.      LISTING EDUCATION    Patient educated regarding the following prior to placement on the transplant waiting list:   The patient?s medical condition, prognosis, and treatment plan.   The expectations and patient responsibilities while on the waiting list, including:   Keeping the transplant center informed of any changes in contact information or insurance coverage   Notifying the transplant center of any changes in medical status   Required testing and/or re-evaluation appointments while awaiting transplant   An overview of the surgical procedure, including potential risks and alternatives.   Information regarding what to expect during the inpatient admission and recovery period.   A discussion regarding organ offers and types of potential donors, including potential risks that may be associated with specific types of donors that could affect the success of the transplant or the health of the patient.  The right to refuse transplantation.     Patient was given the opportunity to have questions answered. Patient was provided a copy of the informed consent for transplant listing.    Education provided by:  Transplant Coordinator: PRICILA Domínguez RN  Transplant Physician: TITO Morales MD    Signed listing informed consent received?  YES  Patient agrees to be listed for the following:  KDPI > 85% [x]  Donors After Circulatory Death (DCD) [x]  Donors with a Positive Core Antibody for Hepatitis B [x]  Donors with Hepatitis C Virus to recipients with hepatitis C []  Donors with Hepatitis C Virus to Negative hepatitis C recipients [x]    Patient will be discussed at an upcoming selection committee to determine eligibility to be placed on the [facility] waiting list.

## 2023-12-11 NOTE — TELEPHONE ENCOUNTER
UTI noted on evaluation labs. Per Dr. Morales, send to PCP for treatment. Called patient. Patient does not have PCP currently. Will send to primary nephrologist for treatment. He verbalized understanding. Faxed to Dr. Horton.

## 2023-12-14 NOTE — TELEPHONE ENCOUNTER
SW reached out to Pt's secondary support, his friend Omaira via telephone call. Omaira shared that she would be able to take time off from work and would have access to PTO, she drives and has a working vehicle, and has no other caregiver responsibilities. Omaira shared her only concern is that she had a double knee replacement and is currently working on being able to climb stairs again. Omaira reported that she is going to reach out to Pt to discuss how many stairs he has in his apartment. Omaira shared that if he has too many stairs, her  is available to be a support as well. SW encouraged Omaira to give SW a call if she has any concerns. Omaira denied any further questions/concerns at this time.     Plan: ALMITA will follow-up with Pt annually.

## 2024-01-01 ENCOUNTER — LAB REQUISITION (OUTPATIENT)
Dept: LAB | Facility: HOSPITAL | Age: 59
End: 2024-01-01
Payer: COMMERCIAL

## 2024-01-01 ENCOUNTER — HOSPITAL ENCOUNTER (OUTPATIENT)
Facility: HOSPITAL | Age: 59
Setting detail: OUTPATIENT SURGERY
Discharge: HOME | End: 2024-02-29
Attending: INTERNAL MEDICINE | Admitting: INTERNAL MEDICINE
Payer: COMMERCIAL

## 2024-01-01 ENCOUNTER — TELEPHONE (OUTPATIENT)
Dept: TRANSPLANT | Facility: HOSPITAL | Age: 59
End: 2024-01-01
Payer: MEDICARE

## 2024-01-01 ENCOUNTER — OFFICE VISIT (OUTPATIENT)
Dept: CARDIOLOGY | Facility: HOSPITAL | Age: 59
End: 2024-01-01
Payer: MEDICARE

## 2024-01-01 ENCOUNTER — APPOINTMENT (OUTPATIENT)
Dept: CARDIOLOGY | Facility: HOSPITAL | Age: 59
End: 2024-01-01
Payer: COMMERCIAL

## 2024-01-01 VITALS
RESPIRATION RATE: 16 BRPM | HEART RATE: 73 BPM | DIASTOLIC BLOOD PRESSURE: 60 MMHG | SYSTOLIC BLOOD PRESSURE: 101 MMHG | OXYGEN SATURATION: 95 %

## 2024-01-01 VITALS
OXYGEN SATURATION: 93 % | WEIGHT: 166.25 LBS | BODY MASS INDEX: 24.62 KG/M2 | SYSTOLIC BLOOD PRESSURE: 91 MMHG | HEIGHT: 69 IN | HEART RATE: 76 BPM | DIASTOLIC BLOOD PRESSURE: 60 MMHG

## 2024-01-01 DIAGNOSIS — I42.5 CARDIOMYOPATHY, RESTRICTIVE (MULTI): ICD-10-CM

## 2024-01-01 DIAGNOSIS — I25.83 CORONARY ATHEROSCLEROSIS DUE TO LIPID RICH PLAQUE (CODE): ICD-10-CM

## 2024-01-01 DIAGNOSIS — I27.20 PHT (PULMONARY HYPERTENSION) (MULTI): ICD-10-CM

## 2024-01-01 DIAGNOSIS — I25.10 ATHEROSCLEROSIS OF NATIVE CORONARY ARTERY, UNSPECIFIED WHETHER ANGINA PRESENT, UNSPECIFIED WHETHER NATIVE OR TRANSPLANTED HEART: Primary | ICD-10-CM

## 2024-01-01 DIAGNOSIS — I25.10 ATHEROSCLEROSIS OF NATIVE CORONARY ARTERY, UNSPECIFIED WHETHER ANGINA PRESENT, UNSPECIFIED WHETHER NATIVE OR TRANSPLANTED HEART: ICD-10-CM

## 2024-01-01 DIAGNOSIS — Z01.818 PREOP EXAMINATION: Primary | ICD-10-CM

## 2024-01-01 DIAGNOSIS — Z01.810 PREOP CARDIOVASCULAR EXAM: ICD-10-CM

## 2024-01-01 LAB
ACT BLD: 181 SEC (ref 89–169)
ANION GAP SERPL CALC-SCNC: 21 MMOL/L (ref 10–20)
ATRIAL RATE: 93 BPM
BUN SERPL-MCNC: 66 MG/DL (ref 6–23)
CALCIUM SERPL-MCNC: 9.6 MG/DL (ref 8.6–10.6)
CHLORIDE SERPL-SCNC: 93 MMOL/L (ref 98–107)
CO2 SERPL-SCNC: 27 MMOL/L (ref 21–32)
CREAT SERPL-MCNC: 6.87 MG/DL (ref 0.5–1.3)
EGFRCR SERPLBLD CKD-EPI 2021: 9 ML/MIN/1.73M*2
ERYTHROCYTE [DISTWIDTH] IN BLOOD BY AUTOMATED COUNT: 17.9 % (ref 11.5–14.5)
GLUCOSE SERPL-MCNC: 121 MG/DL (ref 74–99)
HCT VFR BLD AUTO: 33.7 % (ref 41–52)
HGB BLD-MCNC: 9.7 G/DL (ref 13.5–17.5)
LABORATORY COMMENT REPORT: NORMAL
MCH RBC QN AUTO: 26.3 PG (ref 26–34)
MCHC RBC AUTO-ENTMCNC: 28.8 G/DL (ref 32–36)
MCV RBC AUTO: 91 FL (ref 80–100)
NRBC BLD-RTO: 0 /100 WBCS (ref 0–0)
PATH REPORT.GROSS SPEC: NORMAL
PATH REPORT.TOTAL CANCER: NORMAL
PLATELET # BLD AUTO: 184 X10*3/UL (ref 150–450)
POTASSIUM SERPL-SCNC: 3.5 MMOL/L (ref 3.5–5.3)
PR INTERVAL: 160 MS
Q ONSET: 171 MS
Q ONSET: 173 MS
QRS COUNT: 13 BEATS
QRS COUNT: 15 BEATS
QRS DURATION: 170 MS
QRS DURATION: 172 MS
QT INTERVAL: 464 MS
QT INTERVAL: 498 MS
QTC CALCULATION(BAZETT): 574 MS
QTC CALCULATION(BAZETT): 576 MS
QTC FREDERICIA: 537 MS
QTC FREDERICIA: 548 MS
R AXIS: -61 DEGREES
R AXIS: -80 DEGREES
RBC # BLD AUTO: 3.69 X10*6/UL (ref 4.5–5.9)
SODIUM SERPL-SCNC: 137 MMOL/L (ref 136–145)
T AXIS: 5 DEGREES
T AXIS: 7 DEGREES
T OFFSET: 405 MS
T OFFSET: 420 MS
VENTRICULAR RATE: 80 BPM
VENTRICULAR RATE: 93 BPM
WBC # BLD AUTO: 10.7 X10*3/UL (ref 4.4–11.3)

## 2024-01-01 PROCEDURE — 7100000010 HC PHASE TWO TIME - EACH INCREMENTAL 1 MINUTE: Performed by: INTERNAL MEDICINE

## 2024-01-01 PROCEDURE — 93459 L HRT ART/GRFT ANGIO: CPT | Performed by: INTERNAL MEDICINE

## 2024-01-01 PROCEDURE — C1894 INTRO/SHEATH, NON-LASER: HCPCS | Performed by: INTERNAL MEDICINE

## 2024-01-01 PROCEDURE — 7100000009 HC PHASE TWO TIME - INITIAL BASE CHARGE: Performed by: INTERNAL MEDICINE

## 2024-01-01 PROCEDURE — 99204 OFFICE O/P NEW MOD 45 MIN: CPT | Performed by: INTERNAL MEDICINE

## 2024-01-01 PROCEDURE — 93005 ELECTROCARDIOGRAM TRACING: CPT | Performed by: INTERNAL MEDICINE

## 2024-01-01 PROCEDURE — 93005 ELECTROCARDIOGRAM TRACING: CPT | Mod: 59

## 2024-01-01 PROCEDURE — 85027 COMPLETE CBC AUTOMATED: CPT | Performed by: INTERNAL MEDICINE

## 2024-01-01 PROCEDURE — 99153 MOD SED SAME PHYS/QHP EA: CPT | Performed by: INTERNAL MEDICINE

## 2024-01-01 PROCEDURE — 2720000007 HC OR 272 NO HCPCS: Performed by: INTERNAL MEDICINE

## 2024-01-01 PROCEDURE — 93010 ELECTROCARDIOGRAM REPORT: CPT | Performed by: INTERNAL MEDICINE

## 2024-01-01 PROCEDURE — 93461 R&L HRT ART/VENTRICLE ANGIO: CPT | Performed by: INTERNAL MEDICINE

## 2024-01-01 PROCEDURE — 93457 R HRT ART/GRFT ANGIO: CPT | Performed by: INTERNAL MEDICINE

## 2024-01-01 PROCEDURE — 99214 OFFICE O/P EST MOD 30 MIN: CPT | Performed by: INTERNAL MEDICINE

## 2024-01-01 PROCEDURE — 3074F SYST BP LT 130 MM HG: CPT | Performed by: INTERNAL MEDICINE

## 2024-01-01 PROCEDURE — 1036F TOBACCO NON-USER: CPT | Performed by: INTERNAL MEDICINE

## 2024-01-01 PROCEDURE — 80048 BASIC METABOLIC PNL TOTAL CA: CPT | Performed by: INTERNAL MEDICINE

## 2024-01-01 PROCEDURE — 93451 RIGHT HEART CATH: CPT | Mod: CCI | Performed by: INTERNAL MEDICINE

## 2024-01-01 PROCEDURE — 36415 COLL VENOUS BLD VENIPUNCTURE: CPT | Performed by: INTERNAL MEDICINE

## 2024-01-01 PROCEDURE — 2500000005 HC RX 250 GENERAL PHARMACY W/O HCPCS: Performed by: INTERNAL MEDICINE

## 2024-01-01 PROCEDURE — 3008F BODY MASS INDEX DOCD: CPT | Performed by: INTERNAL MEDICINE

## 2024-01-01 PROCEDURE — 85347 COAGULATION TIME ACTIVATED: CPT | Performed by: INTERNAL MEDICINE

## 2024-01-01 PROCEDURE — 3078F DIAST BP <80 MM HG: CPT | Performed by: INTERNAL MEDICINE

## 2024-01-01 PROCEDURE — 99152 MOD SED SAME PHYS/QHP 5/>YRS: CPT | Performed by: INTERNAL MEDICINE

## 2024-01-01 PROCEDURE — 85347 COAGULATION TIME ACTIVATED: CPT

## 2024-01-01 PROCEDURE — 2500000004 HC RX 250 GENERAL PHARMACY W/ HCPCS (ALT 636 FOR OP/ED): Performed by: INTERNAL MEDICINE

## 2024-01-01 PROCEDURE — C1887 CATHETER, GUIDING: HCPCS | Performed by: INTERNAL MEDICINE

## 2024-01-01 RX ORDER — HEPARIN SODIUM 1000 [USP'U]/ML
INJECTION, SOLUTION INTRAVENOUS; SUBCUTANEOUS AS NEEDED
Status: DISCONTINUED | OUTPATIENT
Start: 2024-01-01 | End: 2024-01-01 | Stop reason: HOSPADM

## 2024-01-01 RX ORDER — AMPICILLIN 2 G/1
2 INJECTION, POWDER, FOR SOLUTION INTRAVENOUS
Status: ON HOLD | COMMUNITY
Start: 2024-01-01 | End: 2024-01-01 | Stop reason: ALTCHOICE

## 2024-01-01 RX ORDER — FENTANYL CITRATE 50 UG/ML
INJECTION, SOLUTION INTRAMUSCULAR; INTRAVENOUS AS NEEDED
Status: DISCONTINUED | OUTPATIENT
Start: 2024-01-01 | End: 2024-01-01 | Stop reason: HOSPADM

## 2024-01-01 RX ORDER — BUMETANIDE 1 MG/1
1 TABLET ORAL 2 TIMES DAILY
COMMUNITY

## 2024-01-01 RX ORDER — LIDOCAINE HYDROCHLORIDE 20 MG/ML
INJECTION, SOLUTION INFILTRATION; PERINEURAL AS NEEDED
Status: DISCONTINUED | OUTPATIENT
Start: 2024-01-01 | End: 2024-01-01 | Stop reason: HOSPADM

## 2024-01-01 RX ORDER — SUCROFERRIC OXYHYDROXIDE 500 MG/1
500 TABLET, CHEWABLE ORAL
COMMUNITY

## 2024-01-01 RX ORDER — MIDAZOLAM HYDROCHLORIDE 1 MG/ML
INJECTION, SOLUTION INTRAMUSCULAR; INTRAVENOUS AS NEEDED
Status: DISCONTINUED | OUTPATIENT
Start: 2024-01-01 | End: 2024-01-01 | Stop reason: HOSPADM

## 2024-01-01 RX ORDER — BLOOD SUGAR DIAGNOSTIC
STRIP MISCELLANEOUS
COMMUNITY
Start: 2023-01-01

## 2024-01-01 RX ORDER — VANCOMYCIN HYDROCHLORIDE 250 MG/1
250 CAPSULE ORAL EVERY 6 HOURS
Status: ON HOLD | COMMUNITY
Start: 2024-01-01 | End: 2024-01-01 | Stop reason: ALTCHOICE

## 2024-01-01 RX ORDER — LANCETS 30 GAUGE
EACH MISCELLANEOUS
COMMUNITY
Start: 2023-01-01

## 2024-01-01 RX ORDER — GENTAMICIN SULFATE 80 MG/50ML
80 INJECTION, SOLUTION INTRAVENOUS
COMMUNITY
Start: 2024-01-01 | End: 2024-01-01

## 2024-01-01 RX ORDER — TORSEMIDE 20 MG/1
20 TABLET ORAL 2 TIMES DAILY
COMMUNITY
Start: 2023-01-01

## 2024-01-01 RX ORDER — FLUTICASONE PROPIONATE 50 MCG
1 SPRAY, SUSPENSION (ML) NASAL DAILY
COMMUNITY

## 2024-01-01 RX ORDER — LORATADINE 10 MG/1
20 TABLET ORAL DAILY
COMMUNITY

## 2024-01-01 RX ORDER — TRAMADOL HYDROCHLORIDE 50 MG/1
50 TABLET ORAL EVERY 6 HOURS PRN
Status: ON HOLD | COMMUNITY
Start: 2023-01-01 | End: 2024-01-01 | Stop reason: WASHOUT

## 2024-01-01 ASSESSMENT — PAIN SCALES - GENERAL
PAINLEVEL_OUTOF10: 0 - NO PAIN
PAINLEVEL_OUTOF10: 0 - NO PAIN
PAINLEVEL: 6

## 2024-01-01 ASSESSMENT — PAIN - FUNCTIONAL ASSESSMENT
PAIN_FUNCTIONAL_ASSESSMENT: 0-10
PAIN_FUNCTIONAL_ASSESSMENT: 0-10

## 2024-01-11 PROBLEM — L84 FOOT CALLUS: Status: ACTIVE | Noted: 2020-09-30

## 2024-01-11 PROBLEM — M85.80 OSTEOPENIA: Status: ACTIVE | Noted: 2021-03-31

## 2024-01-11 PROBLEM — R18.8 OTHER ASCITES: Status: ACTIVE | Noted: 2022-07-25

## 2024-01-11 PROBLEM — N18.6 END STAGE RENAL DISEASE (MULTI): Status: ACTIVE | Noted: 2024-01-01

## 2024-01-11 PROBLEM — M25.512 LEFT SHOULDER PAIN: Status: ACTIVE | Noted: 2023-01-01

## 2024-01-11 PROBLEM — I77.0: Status: ACTIVE | Noted: 2018-03-20

## 2024-01-11 PROBLEM — S82.002A LEFT PATELLA FRACTURE: Status: ACTIVE | Noted: 2024-01-01

## 2024-01-11 PROBLEM — N28.89: Status: ACTIVE | Noted: 2024-01-01

## 2024-01-11 PROBLEM — R10.9 PAIN IN THE ABDOMEN: Status: ACTIVE | Noted: 2024-01-01

## 2024-01-11 PROBLEM — R25.1 TREMOR: Status: ACTIVE | Noted: 2024-01-01

## 2024-01-11 PROBLEM — G56.12 MEDIAN NEUROPATHY, LEFT: Status: ACTIVE | Noted: 2024-01-01

## 2024-01-11 PROBLEM — T86.19: Status: ACTIVE | Noted: 2024-01-01

## 2024-01-11 PROBLEM — D84.9 IMMUNOSUPPRESSION (MULTI): Status: ACTIVE | Noted: 2021-05-05

## 2024-01-11 PROBLEM — K92.2 GI BLEED: Status: ACTIVE | Noted: 2023-01-01

## 2024-01-11 PROBLEM — I38 HEART VALVE DISORDER: Status: ACTIVE | Noted: 2022-12-02

## 2024-01-11 PROBLEM — G56.22 ULNAR NEUROPATHY OF LEFT UPPER EXTREMITY: Status: ACTIVE | Noted: 2020-12-02

## 2024-01-11 PROBLEM — Z94.0 HISTORY OF KIDNEY TRANSPLANT (HHS-HCC): Status: ACTIVE | Noted: 2019-09-30

## 2024-01-11 PROBLEM — A49.8 CLOSTRIDIOIDES DIFFICILE INFECTION: Status: ACTIVE | Noted: 2024-01-01

## 2024-01-11 PROBLEM — I51.89 RIGHT VENTRICULAR SYSTOLIC DYSFUNCTION: Status: ACTIVE | Noted: 2024-01-01

## 2024-01-11 PROBLEM — M25.522 LEFT ELBOW PAIN: Status: ACTIVE | Noted: 2024-01-01

## 2024-01-11 PROBLEM — D69.6 THROMBOCYTOPENIA (CMS-HCC): Status: ACTIVE | Noted: 2021-05-07

## 2024-01-11 PROBLEM — I34.0 MITRAL REGURGITATION: Status: ACTIVE | Noted: 2024-01-01

## 2024-01-11 PROBLEM — R60.9 EDEMA: Status: ACTIVE | Noted: 2024-01-01

## 2024-01-11 PROBLEM — M21.512 CLAW HAND OF LEFT UPPER EXTREMITY: Status: ACTIVE | Noted: 2020-12-02

## 2024-01-11 PROBLEM — N25.81 HYPERPARATHYROIDISM DUE TO RENAL INSUFFICIENCY (MULTI): Status: ACTIVE | Noted: 2018-03-20

## 2024-01-11 PROBLEM — K74.60 CIRRHOSIS OF LIVER WITH ASCITES (MULTI): Status: ACTIVE | Noted: 2022-12-02

## 2024-01-11 PROBLEM — K64.0 FIRST DEGREE HEMORRHOIDS: Status: ACTIVE | Noted: 2018-12-11

## 2024-01-11 PROBLEM — D64.9 LOW HEMOGLOBIN: Status: ACTIVE | Noted: 2023-01-01

## 2024-01-11 PROBLEM — R78.81 BACTEREMIA: Status: ACTIVE | Noted: 2024-01-01

## 2024-01-11 PROBLEM — E66.9 OBESITY WITH BODY MASS INDEX 30 OR GREATER: Status: ACTIVE | Noted: 2019-02-07

## 2024-01-11 PROBLEM — R19.7 DIARRHEA: Status: ACTIVE | Noted: 2024-01-01

## 2024-01-11 PROBLEM — Z86.010 PERSONAL HISTORY OF COLONIC POLYPS: Status: ACTIVE | Noted: 2018-12-14

## 2024-01-11 PROBLEM — E78.5 HLD (HYPERLIPIDEMIA): Status: ACTIVE | Noted: 2022-12-02

## 2024-01-11 PROBLEM — R09.89 PULMONARY HYPERINFLATION: Status: ACTIVE | Noted: 2023-01-01

## 2024-01-11 PROBLEM — K43.2 INCISIONAL HERNIA: Status: ACTIVE | Noted: 2024-01-01

## 2024-01-11 PROBLEM — L03.114 CELLULITIS OF LEFT WRIST: Status: ACTIVE | Noted: 2023-01-01

## 2024-01-11 PROBLEM — G56.20 ULNAR NERVE NEUROPATHY: Status: ACTIVE | Noted: 2024-01-01

## 2024-01-11 PROBLEM — R79.89 BLOOD CREATININE INCREASED COMPARED WITH PRIOR MEASUREMENT: Status: ACTIVE | Noted: 2024-01-01

## 2024-01-11 PROBLEM — R12 HEARTBURN: Status: ACTIVE | Noted: 2024-01-01

## 2024-01-11 PROBLEM — N18.31 STAGE 3A CHRONIC KIDNEY DISEASE (MULTI): Status: ACTIVE | Noted: 2020-12-10

## 2024-01-11 PROBLEM — D63.1 ANEMIA OF CHRONIC RENAL FAILURE: Status: ACTIVE | Noted: 2018-03-20

## 2024-01-11 PROBLEM — N18.9 ANEMIA OF CHRONIC RENAL FAILURE: Status: ACTIVE | Noted: 2018-03-20

## 2024-01-11 PROBLEM — K59.00 CONSTIPATION: Status: ACTIVE | Noted: 2018-09-10

## 2024-01-11 PROBLEM — M25.532 LEFT WRIST PAIN: Status: ACTIVE | Noted: 2023-01-01

## 2024-01-11 PROBLEM — R20.0 HAND NUMBNESS: Status: ACTIVE | Noted: 2024-01-01

## 2024-01-11 PROBLEM — R19.00 SWELLING OF ABDOMEN: Status: ACTIVE | Noted: 2024-01-01

## 2024-01-11 PROBLEM — K63.89 MELANOSIS COLI: Status: ACTIVE | Noted: 2018-12-11

## 2024-01-11 PROBLEM — E87.70 VOLUME OVERLOAD: Status: ACTIVE | Noted: 2024-01-01

## 2024-01-11 PROBLEM — R18.8 CIRRHOSIS OF LIVER WITH ASCITES (MULTI): Status: ACTIVE | Noted: 2022-12-02

## 2024-01-11 PROBLEM — I12.9 RENAL HYPERTENSION: Status: ACTIVE | Noted: 2018-03-20

## 2024-01-11 NOTE — PROGRESS NOTES
Subjective   Ramy Smalls is a 58 y.o. male presenting for renal transplant workup on a background of ESRD s/p DDKT (2019) - failed, now on HD, DM2, restrictive cardiomyopathy, CAD s/p CABGx2 (8/2014), pulmonary hypertension.   Cardiologist - Abdelrahman Garcia    Investigations:  TTE (10/2023) - LVEF 70%, moderately dilated RV with mildly impaired systolic function, RVSP 60 mmHg.   Nuclear stress test (10/2023) - normal perfusion.   TTE (5/2023) - LVEF 65-70%, mildly impaired RV function, mild-moderate aortic stenosis, severely elevated pulmonary pressures.   LHC (3/2023) -   RHC (3/2023) - RA 18, RV 75/18, PA 75/30, PCWP 22, LVEDP 20-22, restrictive physiology.   CMR (5/2023) - LVEF 61%, concentric LVH, no prior infarction. No evidence of infiltration.     Chief Complaint:  No chief complaint on file.    HPI  No chest pain.   Walking currently limited by knee pain.   Independent ADLs.    Developed ascites after the prior transplant.   Commenced on dialysis in May.   - improved energy/appetite.   - no further need for paracentesis.  - still making urine.    Now isolated left leg/knee swelling, ?arthritis. No right leg swelling.   No orthopnea or PND.   No palpitations or syncope.     Shx - [employer], [employer], [employer].    ROS  A 10-system review was performed and was unremarkable apart from what is presented in the HPI.     Objective   Physical Exam  Alert and orientated.   Appropriate responses, normal affect.  No respiratory distress at rest.   Skin warm and dry.   Normal radial pulse character and volume. Clinically SR.   Anicteric sclera, no conjunctival pallor.   JVD 8-9 cm.   S3, PSM.   Chest clear on auscultation.   Calves soft, non-tender.  Right lower limb edema.   EKG - SR, RBBB, LAFB.     Lab Review:   Lab Results   Component Value Date    NA CANCELED 06/03/2023    K CANCELED 06/03/2023    CL CANCELED 06/03/2023    CO2 CANCELED 06/03/2023    BUN 85 (H) 12/05/2023    CREATININE 8.40 (H) 12/05/2023     GLUCOSE CANCELED 06/03/2023    CALCIUM CANCELED 06/03/2023     Lab Results   Component Value Date    WBC 8.6 12/05/2023    HGB 11.1 (L) 12/05/2023    HCT 35.5 (L) 12/05/2023    MCV 90 12/05/2023     (L) 12/05/2023     Lab Results   Component Value Date    CHOL 136 05/15/2023    TRIG 195 (H) 05/15/2023    HDL 43.4 05/15/2023       Assessment/Plan   In summary, Mr Smalls is a Ramy S Slim is a 58 y.o. male presenting for renal transplant workup on a background of ESRD s/p DDKT (2019) - failed, now on HD, DM2, restrictive cardiomyopathy, CAD s/p CABGx2 (8/2014), pulmonary hypertension.  He denies chest pain and he reports that he is ambulant and the main limited to his mobility is knee pain.  He denies any issues with fluid overload since recommencing dialysis in May.  On examination today his chest was clear and the jugular venous pulsations visible 8-9 cm above the RA.  His chest was clear on auscultation and he has a pansystolic murmur.  His EKG showed sinus rhythm with bundle branch block and left anterior fascicular block.  Prior echocardiograms have been suggestive of restrictive cardiomyopathy and his last RHC demonstrated elevated LVEDP, PCWP and PA pressures, however this was performed prior to resumption of dialysis.  As such I have arranged for a repeat LHC/RHC and I will follow-up with Mr. Smalls after his investigations.

## 2024-01-11 NOTE — PATIENT INSTRUCTIONS
Thank you for attending the cardiology clinic at [facility] today. It was nice to meet you again.     Your EKG showed a normal heart rhythm with a chronic conduction abnormality.     Your recent echocardiogram showed normal heart muscle contraction and your nuclear stress test showed normal perfusion to the lungs.     Your last heart catheterization showed severely elevated arterial pressures in the lungs and this test will need to be repeated now that you are stable on dialysis. We will call you to schedule.     I will follow-up with you after your heart catheter.

## 2024-01-24 PROBLEM — I27.20: Status: ACTIVE | Noted: 2024-01-01

## 2024-01-24 PROBLEM — I42.5: Status: ACTIVE | Noted: 2024-01-01
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
## 2024-02-29 NOTE — INTERVAL H&P NOTE
H&P reviewed. The patient was examined and there are no changes to the H&P.  Interviewed in the cath lab.  Feeling good aware of why cath being done.  He gets dialysis M-W-F    Elective caths today as planned

## 2024-02-29 NOTE — PROGRESS NOTES
Pharmacy Medication History Review    Ramy Smalls is a 58 y.o. male admitted for No Principal Problem: There is no principal problem currently on the Problem List. Please update the Problem List and refresh.. Pharmacy reviewed the patient's [id number] medications and allergies for accuracy.    The list below reflects the updated PTA list. Comments regarding how patient may be taking medications differently can be found in the Admit Orders Activity  Prior to Admission Medications   Prescriptions Last Dose Informant Patient Reported?   OneTouch Ultra Test strip  Self Yes   OneTouch Ultra2 Meter misc  Self Yes   amiodarone (Pacerone) 200 mg tablet 2/28/2024 Self Yes   Sig: Take 1 tablet (200 mg) by mouth once daily.   aspirin 81 mg EC tablet 2/28/2024 Self Yes   Sig: Take 1 tablet (81 mg) by mouth once daily.   atorvastatin (Lipitor) 40 mg tablet 2/28/2024 Self Yes   Sig: Take 1 tablet (40 mg) by mouth once daily.   bimatoprost (Lumigan) 0.01 % ophthalmic solution 2/28/2024 Self Yes   Sig: Administer 1 drop into both eyes once daily at bedtime.   brimonidine (Alphagan P) 0.1 % ophthalmic solution 2/28/2024 Self Yes   Sig: Administer 1 drop into both eyes 2 times a day.   bumetanide (Bumex) 1 mg tablet 2/28/2024 Self Yes   Sig: Take 1 tablet (1 mg) by mouth 2 times a day.   cholecalciferol (Vitamin D-3) 50 mcg (2,000 unit) capsule 2/28/2024 Self Yes   Sig: Take 1 capsule (50 mcg) by mouth early in the morning..   ferrous sulfate, 325 mg ferrous sulfate, tablet 2/28/2024 Self Yes   Sig: Take 1 tablet by mouth once daily with breakfast.   fluticasone (Flonase) 50 mcg/actuation nasal spray 2/28/2024 Self Yes   Sig: Administer 1 spray into each nostril once daily. Shake gently. Before first use, prime pump. After use, clean tip and replace cap.   insulin aspart (NovoLOG Flexpen U-100 Insulin) 100 unit/mL (3 mL) pen 2/28/2024 Self Yes   Sig: Inject under the skin 3 times a day before meals.   insulin detemir  (Levemir FlexPen) 100 unit/mL (3 mL) pen 2/28/2024 Self Yes   Sig: Inject 20 Units under the skin once daily at bedtime.   loratadine (Claritin) 10 mg tablet 2/28/2024 Self Yes   Sig: Take 2 tablets (20 mg) by mouth once daily.   multivitamin tablet 2/28/2024 Self Yes   Sig: Take 1 tablet by mouth once daily.   omeprazole (PriLOSEC) 40 mg DR capsule 2/28/2024 Self Yes   Sig: Take 1 capsule (40 mg) by mouth once daily in the morning. Take before meals.   predniSONE (Deltasone) 5 mg tablet 2/28/2024 Self Yes   Sig: Take 1 tablet (5 mg) by mouth once daily.   sevelamer carbonate (Renvela) 800 mg tablet 2/28/2024 Self Yes   Sig: Take 1 tablet (800 mg) by mouth 3 times a day with meals.   sucroferric oxyhydroxide (Velphoro) 500 mg tablet,chewable chewable tablet 2/28/2024 Self Yes   Sig: Chew 1 tablet (500 mg) 3 times a day with meals.   tacrolimus (Prograf) 1 mg capsule 2/28/2024 Self Yes   Sig: Take 1 capsule (1 mg) by mouth 2 times a day.   torsemide (Demadex) 20 mg tablet 2/28/2024 Self Yes   Sig: Take 1 tablet (20 mg) by mouth 2 times a day.      Facility-Administered Medications: None        The list below reflects the updated allergy list. Please review each documented allergy for additional clarification and justification.  Allergies  Reviewed by Sundeep Rosario PharmD on 2/29/2024        Severity Reactions Comments    Capsaicin High Angioedema     Onion High Angioedema Tongue swelling and itching skin    Carvedilol Not Specified Unknown     Glimepiride Not Specified Unknown     Glyburide Not Specified Unknown     Metformin Not Specified Unknown     Pepper (genus Capsicum) Not Specified Unknown             Patient declines M2B at discharge. Pharmacy has been updated to [facility] in [place].    Sources used to complete the med history include out patient fill history, OARRS, and patient interview- good historian.      Below are additional concerns with the patient's PTA list.  JANETTE Rosario  PharmD  Transitions of Care Pharmacist   [facility]  Please reach out via Secure Chat for questions, or if no response call [phone] or vocera [facility]

## 2024-02-29 NOTE — Clinical Note
Multiple views of the saphenous vein graft to the posterior descending artery obtained using power injection.

## 2024-02-29 NOTE — DISCHARGE INSTRUCTIONS
DISCHARGE INSTRUCTIONS FOR GROIN ACCESS    Cath Lab Interventional:   Next 24 hrs.  -DO NOT drive a car, operate machinery (drive) or power tools.  It is recommended that a responsible adult be with you for the first 24 hours.   -DO NOT drink any alcoholic drinks or take any non-prescriptive medications that contain alcohol for the first 24 hours.   -DO NOT make any important decisions for the first 24 hours.  -You are advised to go directly home from the hospital  -You may shower the day after your procedure    Wound Care:  -If slight bleeding should occur at site, lie down and have someone apply firm pressure just above the puncture site for 5 minutes.  If it continues or is profuse, call 911. Always notify your doctor if bleeding occurs.   -Keep site clean and dry. Let air dry or you may use a simple bandaid.   -Gently cleanse the puncture site in your groin with soap and water only.   -You may experience some tenderness, bruising or minimal inflammation.  If you have any concerns, you may contact the Cath Lab or if any of these symptoms become excessive, contact your cardiologist or go to the emergency room.   -No tub baths, soaking, or swimming for one week.   -May shower the next day after your procedure.    Diet:  - Low cholesterol, low fat    Groin site Care:   -DO NOT lift anything for the next 24 hours, then no lifting greater than 10 pounds  for 5 more days.   -Avoid immersion in water of the affected site for the next 3 days.   -Apply manual pressure and call physician immediately if bleeding or hematoma occurs at the site.   -Remove dressing the next day and keep site clean, dry, and covered with a new band aid daily until healed.   -Report any symptoms other than slight tenderness at the site, swelling or the development of a hard pulsating mass under the skin    Other Instructions:  -If you develop difficulty breathing, rash, hives, fever, chills, severe nausea, vomiting, light-headedness or any signs  of infection, immediately contact your doctor and go to the nearest emergency room.   -If you have any questions about the effects of the sedative drugs or groin care, call your primary physician or Cardiologist.     Right neck dressing may remove in the am

## 2024-02-29 NOTE — POST-PROCEDURE NOTE
Physician Transition of Care Summary  Invasive Cardiovascular Lab    Procedure Date: 2/29/2024  Attending:    * Vianney Partida - Primary  Resident/Fellow/Other Assistant: Surgeon(s) and Role:     * Rancho Can MD - Fellow    Indications:   Pre-op Diagnosis     * Atherosclerosis of native coronary artery, unspecified whether angina present, unspecified whether native or transplanted heart [I25.10]     * PHT (pulmonary hypertension) (CMS/HCC) [I27.20]     * Cardiomyopathy, restrictive (CMS/HCC) [I42.5]    Post-procedure diagnosis:   Post-op Diagnosis     * Atherosclerosis of native coronary artery, unspecified whether angina present, unspecified whether native or transplanted heart [I25.10]     * PHT (pulmonary hypertension) (CMS/HCC) [I27.20]     * Cardiomyopathy, restrictive (CMS/HCC) [I42.5]    Procedure(s):   Left And Right Heart Cath, Without LV  [id number] - [facility] R & L HRT CATH WINJX HRT ART& L VENTR IMG    Procedure Findings:   LHC:   All calcified vessels   Calcified LMCA, no significant disease   Large LCx, codominant. Luminal irregularities. Gives medium sized LPDA. Not diseased. Small OM1, 2 and 3. Small OM2 80% stenosis   Occluded prox LAD   Occluded mid RCA, 70% stenosis prox RCA     Widely patent mid LIMA to LAD, no stenosis in ostium, body or distal anastomosis   Widely patent SVG to RPLB     RHC:   RA: 12 (v wave 17 tracings ventricularized)  RV: 64 (EDP 10)  PA: 65/18 (mean 34)   PCWP: mean 11 (v wave 17)    RA sat: 76%  PA sat 64%  CO/CI (Svetlana): 12.84/6.72 (he has an AV fistula for dialysis)     Description of the Procedure:   LHC: 6Fr sheath via L CFA with perclose  RHC: 5Fr sheath via RIJ access with manual closure    Complications:   None.     Stents/Implants:   None.    Anticoagulation/Antiplatelet Plan:   No changes    Estimated Blood Loss:   30 mL    Anesthesia: Moderate Sedation Anesthesia Staff: No anesthesia staff entered.    Any Specimen(s) Removed:   None    Disposition:   Discharge home  after 2 hours bed rest if hemostasis is achieved at all access sites.     Electronically signed by: Rancho Can MD, 2/29/2024 1:46 PM

## 2024-03-19 ENCOUNTER — POST MORTEM DOCUMENTATION (OUTPATIENT)
Dept: TRANSPLANT | Facility: HOSPITAL | Age: 59
End: 2024-03-19
Payer: MEDICARE

## (undated) DEVICE — SHEATH, BRITE TIP 6 X 23

## (undated) DEVICE — CATHETER, ANGIO, IMPULSE, MPA1SH, 5 FR X 100 CM

## (undated) DEVICE — CATHETER, ANGIO, IMPULSE, IM, 5 FR X 100 CM

## (undated) DEVICE — CATHETER, WEDGE PRESSURE, BALLOON, DOUBLE LUMEN, 5 FR, 110 CM

## (undated) DEVICE — GUIDEWIRE, INQWIRE, 3MM J, .035, 260

## (undated) DEVICE — CATHETER, ANGIO, IMPULSE, FL4, 5 FR X 100 CM

## (undated) DEVICE — CATHETER, DIAGNOSTIC, 4 FR-JR 4

## (undated) DEVICE — ACCESS KIT, S-MAK MINI, 4FR 10CM 0.018IN 40CM, NT/PT, ECHO ENHANCE NEEDLE

## (undated) DEVICE — SHEATH, PINNACLE, 10 CM,  5FR INTRODUCER, 5FR DIA, 2.5 CM DIALATOR